# Patient Record
Sex: MALE | Race: WHITE | NOT HISPANIC OR LATINO | Employment: FULL TIME | URBAN - METROPOLITAN AREA
[De-identification: names, ages, dates, MRNs, and addresses within clinical notes are randomized per-mention and may not be internally consistent; named-entity substitution may affect disease eponyms.]

---

## 2021-01-21 ENCOUNTER — CLINICAL SUPPORT (OUTPATIENT)
Dept: URGENT CARE | Facility: CLINIC | Age: 56
End: 2021-01-21
Payer: COMMERCIAL

## 2021-01-21 VITALS — OXYGEN SATURATION: 99 % | HEART RATE: 81 BPM | TEMPERATURE: 97 F

## 2021-01-21 DIAGNOSIS — Z20.822 EXPOSURE TO COVID-19 VIRUS: Primary | ICD-10-CM

## 2021-01-21 LAB
CTP QC/QA: YES
SARS-COV-2 RDRP RESP QL NAA+PROBE: NEGATIVE

## 2021-01-21 PROCEDURE — U0002: ICD-10-PCS | Mod: QW,S$GLB,, | Performed by: INTERNAL MEDICINE

## 2021-01-21 PROCEDURE — U0002 COVID-19 LAB TEST NON-CDC: HCPCS | Mod: QW,S$GLB,, | Performed by: INTERNAL MEDICINE

## 2021-04-15 ENCOUNTER — PATIENT MESSAGE (OUTPATIENT)
Dept: RESEARCH | Facility: HOSPITAL | Age: 56
End: 2021-04-15

## 2023-07-04 ENCOUNTER — OFFICE VISIT (OUTPATIENT)
Dept: URGENT CARE | Facility: CLINIC | Age: 58
End: 2023-07-04
Payer: COMMERCIAL

## 2023-07-04 ENCOUNTER — TELEPHONE (OUTPATIENT)
Dept: URGENT CARE | Facility: CLINIC | Age: 58
End: 2023-07-04

## 2023-07-04 VITALS
RESPIRATION RATE: 20 BRPM | BODY MASS INDEX: 32.14 KG/M2 | HEART RATE: 103 BPM | HEIGHT: 66 IN | TEMPERATURE: 99 F | SYSTOLIC BLOOD PRESSURE: 125 MMHG | WEIGHT: 200 LBS | OXYGEN SATURATION: 96 % | DIASTOLIC BLOOD PRESSURE: 75 MMHG

## 2023-07-04 DIAGNOSIS — R05.1 ACUTE COUGH: ICD-10-CM

## 2023-07-04 DIAGNOSIS — U07.1 COVID-19: Primary | ICD-10-CM

## 2023-07-04 DIAGNOSIS — J02.9 SORE THROAT: ICD-10-CM

## 2023-07-04 LAB
CTP QC/QA: YES
MOLECULAR STREP A: NEGATIVE

## 2023-07-04 PROCEDURE — 87651 STREP A DNA AMP PROBE: CPT | Mod: QW,S$GLB,, | Performed by: NURSE PRACTITIONER

## 2023-07-04 PROCEDURE — 99204 PR OFFICE/OUTPT VISIT, NEW, LEVL IV, 45-59 MIN: ICD-10-PCS | Mod: S$GLB,,, | Performed by: NURSE PRACTITIONER

## 2023-07-04 PROCEDURE — 87651 POCT STREP A MOLECULAR: ICD-10-PCS | Mod: QW,S$GLB,, | Performed by: NURSE PRACTITIONER

## 2023-07-04 PROCEDURE — 99204 OFFICE O/P NEW MOD 45 MIN: CPT | Mod: S$GLB,,, | Performed by: NURSE PRACTITIONER

## 2023-07-04 RX ORDER — EMPAGLIFLOZIN 25 MG/1
25 TABLET, FILM COATED ORAL
COMMUNITY
Start: 2023-07-02

## 2023-07-04 RX ORDER — AMLODIPINE BESYLATE 5 MG/1
5 TABLET ORAL
COMMUNITY
Start: 2023-07-03

## 2023-07-04 RX ORDER — BENZONATATE 100 MG/1
100 CAPSULE ORAL 3 TIMES DAILY PRN
Qty: 30 CAPSULE | Refills: 0 | Status: SHIPPED | OUTPATIENT
Start: 2023-07-04 | End: 2023-07-04

## 2023-07-04 RX ORDER — METOPROLOL TARTRATE 50 MG/1
50 TABLET ORAL 2 TIMES DAILY
COMMUNITY
Start: 2023-06-21

## 2023-07-04 RX ORDER — OLMESARTAN MEDOXOMIL AND HYDROCHLOROTHIAZIDE 40/12.5 40; 12.5 MG/1; MG/1
1 TABLET ORAL
COMMUNITY
Start: 2023-04-11

## 2023-07-04 RX ORDER — GABAPENTIN 300 MG/1
300 CAPSULE ORAL 3 TIMES DAILY
COMMUNITY
Start: 2023-06-09

## 2023-07-04 RX ORDER — FLUTICASONE PROPIONATE 50 MCG
1 SPRAY, SUSPENSION (ML) NASAL DAILY
Qty: 9.9 ML | Refills: 0 | Status: SHIPPED | OUTPATIENT
Start: 2023-07-04

## 2023-07-04 RX ORDER — ATORVASTATIN CALCIUM 40 MG/1
40 TABLET, FILM COATED ORAL
COMMUNITY
Start: 2023-03-23

## 2023-07-04 RX ORDER — GLIMEPIRIDE 4 MG/1
4 TABLET ORAL
COMMUNITY
Start: 2022-12-23

## 2023-07-04 RX ORDER — BENZONATATE 100 MG/1
100 CAPSULE ORAL 3 TIMES DAILY PRN
Qty: 30 CAPSULE | Refills: 0 | Status: SHIPPED | OUTPATIENT
Start: 2023-07-04 | End: 2023-07-14

## 2023-07-04 RX ORDER — FLUTICASONE PROPIONATE 50 MCG
1 SPRAY, SUSPENSION (ML) NASAL DAILY
Qty: 9.9 ML | Refills: 0 | Status: SHIPPED | OUTPATIENT
Start: 2023-07-04 | End: 2023-07-04

## 2023-07-04 RX ORDER — ESCITALOPRAM OXALATE 20 MG/1
20 TABLET ORAL
COMMUNITY
Start: 2023-05-03

## 2023-07-04 NOTE — TELEPHONE ENCOUNTER
Patient called requesting medication from today's Urgent Care visit be sent to a different pharmacy as the original pharmacy was closed for the holiday. Medications sent to [facility] [address] as requested per patient.

## 2023-07-04 NOTE — PATIENT INSTRUCTIONS
POSITIVE COVID TEST:  REPORTS POSITIVE HOME TEST 24 HOURS AGO.  REFUSED PAXLOVID; CURRENTLY TAKING A STATIN.    You have tested positive for COVID-19 today.           ISOLATION    If you tested positive and do not have symptoms, you must isolate for 5 days starting on the day of the positive test. I         If you tested positive and have symptoms, you must isolate for 5 days starting on the day of the first symptoms,  not the day of the positive test.         This is the most important part, both the CDC and the [facility] emphasize that you do not test out of isolation.         After 5 days, if your symptoms have improved and you have not had fever on day 5, you can return to the community on day 6- NO TESTING REQUIRED!          In fact, we do not retest if you were positive in the last 90 days.         After your 5 days of isolation are completed, the CDC recommends strict mask use for the first 5 days that you come out of isolation.      When to Seek Emergency Medical Attention  Look for emergency warning signs* for COVID-19. If someone is showing any of these signs, seek emergency medical care immediately:    Trouble breathing  Persistent pain or pressure in the chest  New confusion  Inability to wake or stay awake  Pale, gray, or blue-colored skin, lips, or nail beds, depending on skin tone  *This list is not all possible symptoms. Please call your medical provider for any other symptoms that are severe or concerning to you.    Call 911 or call ahead to your local emergency facility: Notify the  that you are seeking care for someone who has or may have COVID-19.       For up to date guidelines please visit www.cdc.gov  If you have any questions you may call [facility] line [phone]    What advice should be given to patients with known or presumed COVID-19 managed at home?    For most patients with COVID-19 who are managed at home, we advise the following:    ?Supportive care with  antipyretics/analgesics (eg, acetaminophen) and hydration    ?Close contact with their health care provider    ?Monitoring for clinical worsening, particularly the development of new or worsening dyspnea, which should prompt clinical evaluation and possible hospitalization    ?Separation from other household members, including pets (eg, staying in a separate room when possible and wearing a mask when in the same room)    ?Frequent hand washing for all family members    ?Frequent disinfection of commonly touched surfaces    Some patients with cough or dyspnea may experience symptomatic improvement with self-proning (resting in the prone rather than the supine position)    All other care is generally supportive, similar to that advised for other acute viral illnesses:    ?We advise that patients stay well hydrated, particularly those patients with sustained or higher fevers, in whom insensible fluid losses may be significant.    ?Cough that is persistent, interferes with sleep, or causes discomfort can be managed with an over-the-counter cough medication (eg, dextromethorphan) or prescription benzonatate, 100 to 200 mg orally three times daily as needed.    ?We advise rest as needed during the acute illness; for patients without hypoxia, frequent repositioning and ambulation is encouraged. In addition, we encourage all patients to advance activity as soon as tolerated during recovery.      Additional instructions:  Separate yourself from other people and animals in your home.  Call ahead before visiting your doctor.  Wear a facemask when around others.  Cover your coughs and sneezes.  Wash your hands often with soap and water; hand  can be used, too.  Avoid sharing personal household items.  Wipe down surfaces used daily.  Monitor your symptoms. Seek prompt medical attention if your illness is worsening (e.g., difficulty breathing).   Before seeking care, call your healthcare provider.  If you have a medical  emergency and need to call 911, notify the dispatch personnel that you have, or are being evaluated for COVID-19. If possible, put on a facemask before emergency medical services arrive.      Contact Tracing for patients who have been vaccinated and agree to sequencing.    As one of the next steps, you will receive a call or text from the [place] Department of Health ([facility]) COVID-19 Tracing Team. See the contact information below so you know not to ignore the health departments call. It is important that you contact them back immediately so they can help.      Contact Tracer Number:  [phone]  Caller ID for most carriers: [place] Dept Health     What is contact tracing?  Contact tracing is a process that helps identify everyone who has been in close contact with an infected person. Contact tracers let those people know they may have been exposed and guide them on next steps. Confidentiality is important for everyone; no one will be told who may have exposed them to the virus.  Your involvement is important. The more we know about where and how this virus is spreading, the better chance we have at stopping it from spreading further.  What does exposure mean?  Exposure means you have been within 6 feet for more than 15 minutes with a person who has or had COVID-19.  What kind of questions do the contact tracers ask?  A contact tracer will confirm your basic contact information including name, address, phone number, and next of kin, as well as asking about any symptoms you may have had. Theyll also ask you how you think you may have gotten sick, such as places where you may have been exposed to the virus, and people you were with. Those names will never be shared with anyone outside of that call, and will only be used to help trace and stop the spread of the virus.   I have privacy concerns. How will the state use my information?  Your privacy about your health is important. All calls are completed using call  centers that use the appropriate health privacy protection measures (HIPAA compliance), meaning that your patient information is safe. No one will ever ask you any questions related to immigration status. Your health comes first.   Do I have to participate?  You do not have to participate, but we strongly encourage you to. Contact tracing can help us catch and control new outbreaks as theyre developing to keep your friends and family safe.   What if I dont hear from anyone?  If you dont receive a call within 24 hours, you can call the number above right away to inquire about your status. That line is open from 8:00 am - 8:00 p.m., 7 days a week.  Contact tracing saves lives! Together, we have the power to beat this virus and keep our loved ones and neighbors safe.    For more information see CDC link below.      https://www.cdc.gov/coronavirus/2019-ncov/hcp/guidance-prevent-spread.html#precautions        Sources:  [facility], [facility] and [facility]       You must understand that you've received an Urgent Care treatment only and that you may be released before all your medical problems are known or treated. You, the patient, will arrange for follow up care as instructed.  Follow up with your PCP or specialty clinic as directed in the next 1-2 weeks if not improved or as needed.  You can call [phone] to schedule an appointment with the appropriate provider.  If your condition worsens we recommend that you receive another evaluation at the emergency room immediately or contact your primary medical clinics after hours call service to discuss your concerns.  Please return here or go to the Emergency Department for any concerns or worsening of condition.    If you were prescribed a narcotic or controlled medication, do not drive or operate heavy equipment or machinery while taking these medications.    Thank you for choosing [facility]!    Our goal in the Urgent Care is to always provide  outstanding medical care. You may receive a survey by mail or e-mail in the next week regarding your experience today. We would greatly appreciate you completing and returning the survey. Your feedback provides us with a way to recognize our staff who provide very good care, and it helps us learn how to improve when your experience was below our aspiration of excellence.      We appreciate you trusting us with your medical care. We hope you feel better soon. We will be happy to take care of you for all of your future medical needs.    Sincerely,    Haroon De La Vega DNP, FNP-C

## 2023-07-04 NOTE — PROGRESS NOTES
"Subjective:      Patient ID: Pantera Orourke is a 57 y.o. male.    Vitals:  height is 5' 6" (1.676 m) and weight is 90.7 kg (200 lb). His oral temperature is 99.4 °F (37.4 °C). His blood pressure is 125/75 and his pulse is 103. His respiration is 20 and oxygen saturation is 96%.     Chief Complaint: COVID-19 Concerns (Tested positive for covid on 7/2/2023. Cough, chills, SEVERE soar throat pain. Can't sleep throat hurts so bad. - Entered by patient), Cough, and Generalized Body Aches    57 y.o. male who presents today with a complaint of a sore throat, non-productive cough, congestion, and body aches x 3 days. Reports a positive home Covid test on yesterday.  He denies SOB, chest pain, or palpitations.  Currently refuses Paxlovid.  Discussed symptom management and emergent precautions; he verbalized his understanding.  His spouse is in attendance.    Cough  This is a new problem. The current episode started in the past 7 days. The problem has been unchanged. The cough is Non-productive. Associated symptoms include chills, a fever, headaches, myalgias, nasal congestion, postnasal drip, rhinorrhea, a sore throat, sweats and wheezing. Pertinent negatives include no chest pain, ear congestion, ear pain, heartburn, hemoptysis, rash or weight loss. The symptoms are aggravated by lying down and cold air. Risk factors for lung disease include travel. He has tried nothing for the symptoms. There is no history of asthma, bronchiectasis, bronchitis, COPD, emphysema, environmental allergies or pneumonia.     Constitution: Positive for chills and fever. Negative for generalized weakness and international travel in last 60 days.   HENT:  Positive for postnasal drip and sore throat. Negative for ear pain, sinus pain, sinus pressure, trouble swallowing and voice change.    Neck: Negative for neck stiffness.   Cardiovascular:  Negative for chest pain and palpitations.   Respiratory:  Positive for cough and wheezing. Negative for " sputum production and bloody sputum.    Gastrointestinal:  Negative for heartburn.   Musculoskeletal:  Positive for muscle ache.   Skin:  Negative for rash.   Allergic/Immunologic: Negative for environmental allergies.   Neurological:  Positive for headaches. Negative for dizziness.    Objective:     Physical Exam   Constitutional: He is oriented to person, place, and time. He appears well-developed. He is cooperative.  Non-toxic appearance. He does not appear ill. No distress.   HENT:   Head: Normocephalic and atraumatic.   Ears:   Right Ear: Hearing, tympanic membrane, external ear and ear canal normal.   Left Ear: Hearing, tympanic membrane, external ear and ear canal normal.   Nose: Rhinorrhea and congestion present. No mucosal edema, purulent discharge, sinus tenderness or nasal deformity. No epistaxis. Right sinus exhibits no maxillary sinus tenderness and no frontal sinus tenderness. Left sinus exhibits no maxillary sinus tenderness and no frontal sinus tenderness.   Mouth/Throat: Uvula is midline and mucous membranes are normal. No trismus in the jaw. Normal dentition. No uvula swelling. Posterior oropharyngeal erythema present. No oropharyngeal exudate, posterior oropharyngeal edema, tonsillar abscesses or cobblestoning. No tonsillar exudate.   Eyes: Conjunctivae and lids are normal. No scleral icterus.   Neck: Trachea normal and phonation normal. Neck supple. No edema present. No erythema present. No neck rigidity present.   Cardiovascular: Normal rate, regular rhythm, normal heart sounds and normal pulses.   Pulmonary/Chest: Effort normal and breath sounds normal. No respiratory distress. He has no decreased breath sounds. He has no rhonchi.   Abdominal: Normal appearance.   Musculoskeletal: Normal range of motion.         General: No deformity. Normal range of motion.   Neurological: He is alert and oriented to person, place, and time. He exhibits normal muscle tone. Coordination normal.   Skin: Skin is  warm, dry, intact, not diaphoretic and not pale.   Psychiatric: His speech is normal and behavior is normal. Judgment and thought content normal.   Nursing note and vitals reviewed.    Assessment:     1. COVID-19    2. Sore throat    3. Acute cough      Results for orders placed or performed in visit on 07/04/23   POCT Strep A, Molecular   Result Value Ref Range    Molecular Strep A, POC Negative Negative     Acceptable Yes         Plan:       COVID-19  -     Discontinue: fluticasone propionate (FLONASE) 50 mcg/actuation nasal spray; 1 spray (50 mcg total) by Each Nostril route once daily.  Dispense: 9.9 mL; Refill: 0  -     fluticasone propionate (FLONASE) 50 mcg/actuation nasal spray; 1 spray (50 mcg total) by Each Nostril route once daily.  Dispense: 9.9 mL; Refill: 0    Sore throat  -     POCT Strep A, Molecular  -     Discontinue: diphenhydrAMINE-aluminum-magnesium hydroxide-simethicone-LIDOcaine HCl 2%; Swish and spit 15 mLs every 4 (four) hours as needed (sore throat).  Dispense: 120 each; Refill: 0    Acute cough  -     Discontinue: benzonatate (TESSALON) 100 MG capsule; Take 1 capsule (100 mg total) by mouth 3 (three) times daily as needed for Cough.  Dispense: 30 capsule; Refill: 0  -     benzonatate (TESSALON) 100 MG capsule; Take 1 capsule (100 mg total) by mouth 3 (three) times daily as needed for Cough.  Dispense: 30 capsule; Refill: 0    POSITIVE COVID TEST    You have tested positive for COVID-19 today.           ISOLATION    If you tested positive and do not have symptoms, you must isolate for 5 days starting on the day of the positive test. I         If you tested positive and have symptoms, you must isolate for 5 days starting on the day of the first symptoms,  not the day of the positive test.         This is the most important part, both the CDC and the [facility] emphasize that you do not test out of isolation.         After 5 days, if your symptoms have improved and you have not had  fever on day 5, you can return to the community on day 6- NO TESTING REQUIRED!          In fact, we do not retest if you were positive in the last 90 days.         After your 5 days of isolation are completed, the CDC recommends strict mask use for the first 5 days that you come out of isolation.      When to Seek Emergency Medical Attention  Look for emergency warning signs* for COVID-19. If someone is showing any of these signs, seek emergency medical care immediately:    Trouble breathing  Persistent pain or pressure in the chest  New confusion  Inability to wake or stay awake  Pale, gray, or blue-colored skin, lips, or nail beds, depending on skin tone  *This list is not all possible symptoms. Please call your medical provider for any other symptoms that are severe or concerning to you.    Call 911 or call ahead to your local emergency facility: Notify the  that you are seeking care for someone who has or may have COVID-19.       For up to date guidelines please visit www.cdc.gov  If you have any questions you may call [facility] line [phone]    What advice should be given to patients with known or presumed COVID-19 managed at home?    For most patients with COVID-19 who are managed at home, we advise the following:    ?Supportive care with antipyretics/analgesics (eg, acetaminophen) and hydration    ?Close contact with their health care provider    ?Monitoring for clinical worsening, particularly the development of new or worsening dyspnea, which should prompt clinical evaluation and possible hospitalization    ?Separation from other household members, including pets (eg, staying in a separate room when possible and wearing a mask when in the same room)    ?Frequent hand washing for all family members    ?Frequent disinfection of commonly touched surfaces    Some patients with cough or dyspnea may experience symptomatic improvement with self-proning (resting in the prone rather than  the supine position)    All other care is generally supportive, similar to that advised for other acute viral illnesses:    ?We advise that patients stay well hydrated, particularly those patients with sustained or higher fevers, in whom insensible fluid losses may be significant.    ?Cough that is persistent, interferes with sleep, or causes discomfort can be managed with an over-the-counter cough medication (eg, dextromethorphan) or prescription benzonatate, 100 to 200 mg orally three times daily as needed.    ?We advise rest as needed during the acute illness; for patients without hypoxia, frequent repositioning and ambulation is encouraged. In addition, we encourage all patients to advance activity as soon as tolerated during recovery.      Additional instructions:  Separate yourself from other people and animals in your home.  Call ahead before visiting your doctor.  Wear a facemask when around others.  Cover your coughs and sneezes.  Wash your hands often with soap and water; hand  can be used, too.  Avoid sharing personal household items.  Wipe down surfaces used daily.  Monitor your symptoms. Seek prompt medical attention if your illness is worsening (e.g., difficulty breathing).   Before seeking care, call your healthcare provider.  If you have a medical emergency and need to call 911, notify the dispatch personnel that you have, or are being evaluated for COVID-19. If possible, put on a facemask before emergency medical services arrive.      Contact Tracing for patients who have been vaccinated and agree to sequencing.    As one of the next steps, you will receive a call or text from the [place] Department of Health ([facility]) COVID-19 Tracing Team. See the contact information below so you know not to ignore the health departments call. It is important that you contact them back immediately so they can help.      Contact Tracer Number:  [phone]  Caller ID for most carriers: [facility]      What is contact tracing?  Contact tracing is a process that helps identify everyone who has been in close contact with an infected person. Contact tracers let those people know they may have been exposed and guide them on next steps. Confidentiality is important for everyone; no one will be told who may have exposed them to the virus.  Your involvement is important. The more we know about where and how this virus is spreading, the better chance we have at stopping it from spreading further.  What does exposure mean?  Exposure means you have been within 6 feet for more than 15 minutes with a person who has or had COVID-19.  What kind of questions do the contact tracers ask?  A contact tracer will confirm your basic contact information including name, address, phone number, and next of kin, as well as asking about any symptoms you may have had. Theyll also ask you how you think you may have gotten sick, such as places where you may have been exposed to the virus, and people you were with. Those names will never be shared with anyone outside of that call, and will only be used to help trace and stop the spread of the virus.   I have privacy concerns. How will the state use my information?  Your privacy about your health is important. All calls are completed using call centers that use the appropriate health privacy protection measures (HIPAA compliance), meaning that your patient information is safe. No one will ever ask you any questions related to immigration status. Your health comes first.   Do I have to participate?  You do not have to participate, but we strongly encourage you to. Contact tracing can help us catch and control new outbreaks as theyre developing to keep your friends and family safe.   What if I dont hear from anyone?  If you dont receive a call within 24 hours, you can call the number above right away to inquire about your status. That line is open from 8:00 am - 8:00 p.m., 7 days a  week.  Contact tracing saves lives! Together, we have the power to beat this virus and keep our loved ones and neighbors safe.    For more information see CDC link below.      https://www.cdc.gov/coronavirus/2019-ncov/hcp/guidance-prevent-spread.html#precautions        Sources:  [facility], [place] [facility]

## 2024-10-28 ENCOUNTER — OFFICE VISIT (OUTPATIENT)
Dept: INTERNAL MEDICINE | Facility: CLINIC | Age: 59
End: 2024-10-28
Payer: COMMERCIAL

## 2024-10-28 VITALS
HEIGHT: 66 IN | SYSTOLIC BLOOD PRESSURE: 100 MMHG | OXYGEN SATURATION: 98 % | BODY MASS INDEX: 31.18 KG/M2 | HEART RATE: 98 BPM | DIASTOLIC BLOOD PRESSURE: 60 MMHG | WEIGHT: 194 LBS

## 2024-10-28 DIAGNOSIS — R23.8 SKIN SENSITIVITY: Primary | ICD-10-CM

## 2024-10-28 PROCEDURE — 3078F DIAST BP <80 MM HG: CPT | Mod: CPTII,S$GLB,,

## 2024-10-28 PROCEDURE — 1159F MED LIST DOCD IN RCRD: CPT | Mod: CPTII,S$GLB,,

## 2024-10-28 PROCEDURE — 99203 OFFICE O/P NEW LOW 30 MIN: CPT | Mod: S$GLB,,,

## 2024-10-28 PROCEDURE — 3008F BODY MASS INDEX DOCD: CPT | Mod: CPTII,S$GLB,,

## 2024-10-28 PROCEDURE — 99999 PR PBB SHADOW E&M-EST. PATIENT-LVL IV: CPT | Mod: PBBFAC,,,

## 2024-10-28 PROCEDURE — 1160F RVW MEDS BY RX/DR IN RCRD: CPT | Mod: CPTII,S$GLB,,

## 2024-10-28 PROCEDURE — 3074F SYST BP LT 130 MM HG: CPT | Mod: CPTII,S$GLB,,

## 2024-10-28 RX ORDER — CETIRIZINE HYDROCHLORIDE 10 MG/1
10 TABLET ORAL DAILY
Qty: 30 TABLET | Refills: 0 | Status: SHIPPED | OUTPATIENT
Start: 2024-10-28 | End: 2025-10-28

## 2024-11-01 ENCOUNTER — TELEPHONE (OUTPATIENT)
Dept: INTERNAL MEDICINE | Facility: CLINIC | Age: 59
End: 2024-11-01
Payer: COMMERCIAL

## 2024-11-01 NOTE — TELEPHONE ENCOUNTER
----- Message from Nikky sent at 11/1/2024  9:38 AM CDT -----  Regarding: Medical Advice  Contact: Patient Spouse Madelaine  Type:  Needs Medical Advice    Who Called: Madelaine (Spouse)  Symptoms (please be specific):  skin sensitivity    How long has patient had these symptoms:  n/a   Pharmacy name and phone #:  [id number] - [place], [place] - [address] AT [address]   Phone: [phone]  Would the patient rather a call back or a response via [facility]? Call back   Best Call Back Number:  [phone]   Additional Information: Spouse stated patient was advised to contact physician if advice given did not work Patient is still having issues and would like a call back for further assistance Patient would like an appt today Attempted to schedule first available was not until 12/02/2024 Please Assist

## 2024-11-01 NOTE — TELEPHONE ENCOUNTER
Getting worse   Unable to touch , skin irritation   fatigue , sleeping hours after return from work   Not eating  ,   Has a history of heart disease , on more the 1 blood pressure pill   Suggested urgent care ot ER  And will forward message to doctor

## 2024-11-02 ENCOUNTER — HOSPITAL ENCOUNTER (EMERGENCY)
Facility: HOSPITAL | Age: 59
Discharge: HOME | End: 2024-11-02
Attending: EMERGENCY MEDICINE
Payer: COMMERCIAL

## 2024-11-02 VITALS
SYSTOLIC BLOOD PRESSURE: 116 MMHG | HEIGHT: 66 IN | HEART RATE: 92 BPM | RESPIRATION RATE: 16 BRPM | DIASTOLIC BLOOD PRESSURE: 74 MMHG | BODY MASS INDEX: 32.14 KG/M2 | OXYGEN SATURATION: 96 % | TEMPERATURE: 98 F | WEIGHT: 200 LBS

## 2024-11-02 DIAGNOSIS — R20.8 ALLODYNIA: Primary | ICD-10-CM

## 2024-11-02 DIAGNOSIS — R53.83 FATIGUE, UNSPECIFIED TYPE: ICD-10-CM

## 2024-11-02 LAB
ALBUMIN SERPL BCP-MCNC: 4.3 G/DL (ref 3.5–5.2)
ALP SERPL-CCNC: 81 U/L (ref 40–150)
ALT SERPL W/O P-5'-P-CCNC: 23 U/L (ref 10–44)
ANION GAP SERPL CALC-SCNC: 11 MMOL/L (ref 8–16)
AST SERPL-CCNC: 22 U/L (ref 10–40)
BACTERIA #/AREA URNS AUTO: ABNORMAL /HPF
BASOPHILS # BLD AUTO: 0.04 K/UL (ref 0–0.2)
BASOPHILS NFR BLD: 0.5 % (ref 0–1.9)
BILIRUB SERPL-MCNC: 0.8 MG/DL (ref 0.1–1)
BILIRUB UR QL STRIP: NEGATIVE
BUN SERPL-MCNC: 26 MG/DL (ref 6–20)
BUN SERPL-MCNC: 26 MG/DL (ref 6–30)
CALCIUM SERPL-MCNC: 10.8 MG/DL (ref 8.7–10.5)
CHLORIDE SERPL-SCNC: 101 MMOL/L (ref 95–110)
CHLORIDE SERPL-SCNC: 99 MMOL/L (ref 95–110)
CLARITY UR REFRACT.AUTO: CLEAR
CO2 SERPL-SCNC: 26 MMOL/L (ref 23–29)
COLOR UR AUTO: YELLOW
CREAT SERPL-MCNC: 1.4 MG/DL (ref 0.5–1.4)
CREAT SERPL-MCNC: 1.6 MG/DL (ref 0.5–1.4)
CRP SERPL-MCNC: 1.5 MG/L (ref 0–8.2)
DIFFERENTIAL METHOD BLD: NORMAL
EOSINOPHIL # BLD AUTO: 0 K/UL (ref 0–0.5)
EOSINOPHIL NFR BLD: 0.3 % (ref 0–8)
ERYTHROCYTE [DISTWIDTH] IN BLOOD BY AUTOMATED COUNT: 12.2 % (ref 11.5–14.5)
EST. GFR  (NO RACE VARIABLE): 49.6 ML/MIN/1.73 M^2
GLUCOSE SERPL-MCNC: 177 MG/DL (ref 70–110)
GLUCOSE SERPL-MCNC: 58 MG/DL (ref 70–110)
GLUCOSE UR QL STRIP: ABNORMAL
HCT VFR BLD AUTO: 43.5 % (ref 40–54)
HCT VFR BLD CALC: 36 %PCV (ref 36–54)
HCV AB SERPL QL IA: NORMAL
HGB BLD-MCNC: 14 G/DL (ref 14–18)
HGB UR QL STRIP: NEGATIVE
HIV 1+2 AB+HIV1 P24 AG SERPL QL IA: NORMAL
HYALINE CASTS UR QL AUTO: 6 /LPF
IMM GRANULOCYTES # BLD AUTO: 0.02 K/UL (ref 0–0.04)
IMM GRANULOCYTES NFR BLD AUTO: 0.3 % (ref 0–0.5)
KETONES UR QL STRIP: NEGATIVE
LEUKOCYTE ESTERASE UR QL STRIP: NEGATIVE
LYMPHOCYTES # BLD AUTO: 2.1 K/UL (ref 1–4.8)
LYMPHOCYTES NFR BLD: 28.2 % (ref 18–48)
MAGNESIUM SERPL-MCNC: 2.1 MG/DL (ref 1.6–2.6)
MCH RBC QN AUTO: 29.5 PG (ref 27–31)
MCHC RBC AUTO-ENTMCNC: 32.2 G/DL (ref 32–36)
MCV RBC AUTO: 92 FL (ref 82–98)
MICROSCOPIC COMMENT: ABNORMAL
MONOCYTES # BLD AUTO: 0.6 K/UL (ref 0.3–1)
MONOCYTES NFR BLD: 7.6 % (ref 4–15)
NEUTROPHILS # BLD AUTO: 4.7 K/UL (ref 1.8–7.7)
NEUTROPHILS NFR BLD: 63.1 % (ref 38–73)
NITRITE UR QL STRIP: NEGATIVE
NRBC BLD-RTO: 0 /100 WBC
PH UR STRIP: 5 [PH] (ref 5–8)
PLATELET # BLD AUTO: 231 K/UL (ref 150–450)
PMV BLD AUTO: 12.7 FL (ref 9.2–12.9)
POC IONIZED CALCIUM: 1.31 MMOL/L (ref 1.06–1.42)
POC TCO2 (MEASURED): 27 MMOL/L (ref 23–29)
POTASSIUM BLD-SCNC: 4.1 MMOL/L (ref 3.5–5.1)
POTASSIUM SERPL-SCNC: 4.4 MMOL/L (ref 3.5–5.1)
PROT SERPL-MCNC: 7.6 G/DL (ref 6–8.4)
PROT UR QL STRIP: NEGATIVE
RBC # BLD AUTO: 4.75 M/UL (ref 4.6–6.2)
RBC #/AREA URNS AUTO: 1 /HPF (ref 0–4)
SAMPLE: ABNORMAL
SODIUM BLD-SCNC: 140 MMOL/L (ref 136–145)
SODIUM SERPL-SCNC: 136 MMOL/L (ref 136–145)
SP GR UR STRIP: >1.03 (ref 1–1.03)
SQUAMOUS #/AREA URNS AUTO: 0 /HPF
TSH SERPL DL<=0.005 MIU/L-ACNC: 2.76 UIU/ML (ref 0.4–4)
URN SPEC COLLECT METH UR: ABNORMAL
WBC # BLD AUTO: 7.37 K/UL (ref 3.9–12.7)
WBC #/AREA URNS AUTO: 0 /HPF (ref 0–5)
YEAST UR QL AUTO: ABNORMAL

## 2024-11-02 PROCEDURE — 99284 EMERGENCY DEPT VISIT MOD MDM: CPT | Mod: 25

## 2024-11-02 PROCEDURE — 81001 URINALYSIS AUTO W/SCOPE: CPT | Performed by: EMERGENCY MEDICINE

## 2024-11-02 PROCEDURE — 87389 HIV-1 AG W/HIV-1&-2 AB AG IA: CPT | Performed by: PHYSICIAN ASSISTANT

## 2024-11-02 PROCEDURE — 96360 HYDRATION IV INFUSION INIT: CPT

## 2024-11-02 PROCEDURE — 25000003 PHARM REV CODE 250: Performed by: EMERGENCY MEDICINE

## 2024-11-02 PROCEDURE — 80047 BASIC METABLC PNL IONIZED CA: CPT

## 2024-11-02 PROCEDURE — 86803 HEPATITIS C AB TEST: CPT | Performed by: PHYSICIAN ASSISTANT

## 2024-11-02 PROCEDURE — 80053 COMPREHEN METABOLIC PANEL: CPT | Performed by: EMERGENCY MEDICINE

## 2024-11-02 PROCEDURE — 85025 COMPLETE CBC W/AUTO DIFF WBC: CPT | Performed by: EMERGENCY MEDICINE

## 2024-11-02 PROCEDURE — 86140 C-REACTIVE PROTEIN: CPT | Performed by: EMERGENCY MEDICINE

## 2024-11-02 PROCEDURE — 84443 ASSAY THYROID STIM HORMONE: CPT | Performed by: EMERGENCY MEDICINE

## 2024-11-02 PROCEDURE — 83735 ASSAY OF MAGNESIUM: CPT | Performed by: EMERGENCY MEDICINE

## 2024-11-02 RX ORDER — PREDNISONE 20 MG/1
40 TABLET ORAL DAILY
Qty: 10 TABLET | Refills: 0 | Status: SHIPPED | OUTPATIENT
Start: 2024-11-02 | End: 2024-11-07

## 2024-11-02 RX ADMIN — SODIUM CHLORIDE 1000 ML: 9 INJECTION, SOLUTION INTRAVENOUS at 12:11

## 2024-11-02 NOTE — ED TRIAGE NOTES
Patient identifiers for Pantera Orourke 59 y.o. male checked and correct.  Chief Complaint   Patient presents with    Multiple complaints     Skin sensitivity for 2 weeks , told allergy and to take zertec, now joints hurting, sleeping alot     No past medical history on file.  Allergies reported:   Review of patient's allergies indicates:   Allergen Reactions    Codeine Nausea Only and Nausea And Vomiting         LOC: Patient is awake, alert, and aware of environment with an appropriate affect. Patient is oriented x 4 and speaking appropriately.  APPEARANCE: Patient resting comfortably and in no acute distress. Patient is clean and well groomed, patient's clothing is properly fastened.  SKIN: The skin is warm and dry. Patient has normal skin turgor and moist mucus membranes.   MUSKULOSKELETAL: Patient is moving all extremities well, no obvious deformities noted. Pulses intact. generalized joint pain  RESPIRATORY: Airway is open and patent. Respirations are spontaneous and non-labored with normal effort and rate.  CARDIAC: Patient has a normal rate and rhythm. Normal sinus on cardiac monitor. No peripheral edema noted.   ABDOMEN: No distention noted. Soft and non-tender upon palpation.  NEUROLOGICAL:  PERRL. Facial expression is symmetrical. Hand grasps are equal bilaterally. Normal sensation in all extremities when touched with finger.

## 2024-11-02 NOTE — ED NOTES
I-STAT Chem-8+ Results:   Value Reference Range   Sodium 140 136-145 mmol/L   Potassium  4.1 3.5-5.1 mmol/L   Chloride 101  mmol/L   Ionized Calcium 1.31 1.06-1.42 mmol/L   CO2 (measured) 27 23-29 mmol/L   Glucose 58  mg/dL   BUN 26 6-30 mg/dL   Creatinine 1.4 0.5-1.4 mg/dL   Hematocrit 36 36-54%

## 2024-11-02 NOTE — ED PROVIDER NOTES
Encounter Date: 11/2/2024       History     Chief Complaint   Patient presents with    Multiple complaints     Skin sensitivity for 2 weeks , told allergy and to take zertec, now joints hurting, sleeping alot     HPI  59-year-old male with a history of hypertension and diabetes who presents with some global complaints.  Starting 2 weeks ago he was started having skin sensitivity.  He reports that the skin over his entire body feels overly sensitive and almost like he has a sunburn.  Whenever anything touches it it feels painful.  He describes feeling like he can feels a muscles underneath.  Walking upstairs hurts.  His scalp feels like it is bruised.  He has not had any color changes or rashes.  No new medications or dose changes.  He reports he was doing some Aveeno and Krill oil but otherwise no new detergents or soaps.  He has been seen multiple times including in the ED and by his PCP.  He had some lab work done and was told that his calcium was high.  His PCP tried him on Zyrtec due to concern for possible allergies as the cause but he reports no relief after taking this for at least a week.  Over the last few days he was become more fatigued.  He now we will get home from work at 4:00 p.m., sleep until 10:00 p.m., then go back to bed until the morning.  While he was awake he was very fatigued and has no energy.  He also reports that all of his joints are achy.  No joint swelling or discoloration.  No other swelling in any other areas.  No vomiting or diarrhea.  No fevers or chills.  No sweating.  He was report some likely weight loss due to fatigue so bad that he does not want to get up to eat or drink much.      Review of patient's allergies indicates:   Allergen Reactions    Codeine Nausea Only and Nausea And Vomiting     Past Medical History:   Diagnosis Date    Hypertension      Past Surgical History:   Procedure Laterality Date    BYPASS GRAFT       No family history on file.  Social History     Tobacco  Use    Smoking status: Never    Smokeless tobacco: Never     Review of Systems    Physical Exam     Initial Vitals [11/02/24 0924]   BP Pulse Resp Temp SpO2   119/81 107 16 99 °F (37.2 °C) 97 %      MAP       --         Physical Exam    Nursing note and vitals reviewed.  Constitutional: He appears well-developed and well-nourished. No distress.   HENT:   Head: Normocephalic and atraumatic.   Nose: Nose normal. Mouth/Throat: Oropharynx is clear and moist.   Scalp normal in appearance   Eyes: Conjunctivae and EOM are normal. Pupils are equal, round, and reactive to light.   Neck:   Normal range of motion.  Cardiovascular:  Normal rate, regular rhythm and normal heart sounds.     Exam reveals no gallop and no friction rub.       No murmur heard.  Pulmonary/Chest: Breath sounds normal. No respiratory distress. He has no wheezes. He has no rhonchi. He has no rales.   Abdominal: Abdomen is soft. He exhibits no distension. There is no abdominal tenderness. There is no rebound and no guarding.   Musculoskeletal:         General: No tenderness or edema. Normal range of motion.      Cervical back: Normal range of motion.      Comments: No focal joint swelling or tenderness     Neurological: He is alert and oriented to person, place, and time. He has normal strength. No sensory deficit.   Skin: Skin is warm and dry. Capillary refill takes less than 2 seconds. No rash noted. No erythema. No pallor.   Subjective sensitivity to touching anywhere but skin over trunk and extremities and face it was normal in appearance   Psychiatric: He has a normal mood and affect.         ED Course   Procedures  Labs Reviewed   COMPREHENSIVE METABOLIC PANEL - Abnormal       Result Value    Sodium 136      Potassium 4.4      Chloride 99      CO2 26      Glucose 177 (*)     BUN 26 (*)     Creatinine 1.6 (*)     Calcium 10.8 (*)     Total Protein 7.6      Albumin 4.3      Total Bilirubin 0.8      Alkaline Phosphatase 81      AST 22      ALT 23       eGFR 49.6 (*)     Anion Gap 11     URINALYSIS, REFLEX TO URINE CULTURE - Abnormal    Specimen UA Urine, Clean Catch      Color, UA Yellow      Appearance, UA Clear      pH, UA 5.0      Specific Gravity, UA >1.030 (*)     Protein, UA Negative      Glucose, UA 4+ (*)     Ketones, UA Negative      Bilirubin (UA) Negative      Occult Blood UA Negative      Nitrite, UA Negative      Leukocytes, UA Negative      Narrative:     Specimen Source->Urine   URINALYSIS MICROSCOPIC - Abnormal    RBC, UA 1      WBC, UA 0      Bacteria None      Yeast, UA None      Squam Epithel, UA 0      Hyaline Casts, UA 6 (*)     Microscopic Comment SEE COMMENT      Narrative:     Specimen Source->Urine   ISTAT PROCEDURE - Abnormal    POC Glucose 58 (*)     POC BUN 26      POC Creatinine 1.4      POC Sodium 140      POC Potassium 4.1      POC Chloride 101      POC TCO2 (MEASURED) 27      POC Ionized Calcium 1.31      POC Hematocrit 36      Sample PETRONA     HIV 1 / 2 ANTIBODY    HIV 1/2 Ag/Ab Non-reactive      Narrative:     Release to patient->Immediate   HEPATITIS C ANTIBODY    Hepatitis C Ab Non-reactive      Narrative:     Release to patient->Immediate   CBC W/ AUTO DIFFERENTIAL    WBC 7.37      RBC 4.75      Hemoglobin 14.0      Hematocrit 43.5      MCV 92      MCH 29.5      MCHC 32.2      RDW 12.2      Platelets 231      MPV 12.7      Immature Granulocytes 0.3      Gran # (ANC) 4.7      Immature Grans (Abs) 0.02      Lymph # 2.1      Mono # 0.6      Eos # 0.0      Baso # 0.04      nRBC 0      Gran % 63.1      Lymph % 28.2      Mono % 7.6      Eosinophil % 0.3      Basophil % 0.5      Differential Method Automated     MAGNESIUM    Magnesium 2.1     TSH    TSH 2.760     C-REACTIVE PROTEIN    CRP 1.5     ISTAT CHEM8          Imaging Results    None          Medications   sodium chloride 0.9% bolus 1,000 mL 1,000 mL (0 mLs Intravenous Stopped 11/2/24 4671)     Medical Decision Making  59-year-old male who presents with 2 weeks of entire body  skin sensitivity without any actual skin changes, as well as global fatigue and joint aching without any sort of joint swelling or erythema.  Here his temperature is 99° but has not had any true fevers.  He was mildly tachycardic.  Other vital signs stable.  His exam is overall quite benign.  No new medications, on Ozempic for a year he reports.  Differential includes some sort of new rheumatologic issue, electrolyte abnormality, hyperbilirubinemia, amongst many others.  Point we will check some labs see if there is anything emergent to treat.  Everything is stable, likely will need urgent referral to rheumatology for further workup evaluation.  It does not seem dermatologic in nature so I think rheumatology he was the place to start.    Overall labs are reassuring except for a mild REDD.  Given IV fluids and improved.  He understands the need to start orally hydrating at home which he was able to do.  We will do a low dose course of steroids for 5 days to see if that helps symptoms.  referral to Rheumatology placed.    Amount and/or Complexity of Data Reviewed  Labs: ordered.    Risk  Prescription drug management.                                      Clinical Impression:  Final diagnoses:  [R20.8] Allodynia (Primary)  [R53.83] Fatigue, unspecified type          ED Disposition Condition    Discharge Stable          ED Prescriptions       Medication Sig Dispense Start Date End Date Auth. Provider    predniSONE (DELTASONE) 20 MG tablet Take 2 tablets (40 mg total) by mouth once daily. for 5 days 10 tablet 11/2/2024 11/7/2024 Kathleen Kerr MD          Follow-up Information       Follow up With Specialties Details Why Contact Info Additional Information    [id number] Schedule an appointment as soon as possible for a visit   [address] [place] [id number] [phone] [facility] - Main Building, 5th Floor Please park in [facility] and use  Atrium elevator             Kathleen Kerr MD  11/02/24 7876

## 2024-11-06 ENCOUNTER — TELEPHONE (OUTPATIENT)
Dept: RHEUMATOLOGY | Facility: CLINIC | Age: 59
End: 2024-11-06
Payer: COMMERCIAL

## 2024-11-06 NOTE — TELEPHONE ENCOUNTER
T.C. to patient. States he feels better now after being treated with steroids in the emergency room.  States he would like to see a rheumatologist for his skin symptoms of being very sensitive and tiredness.   Patient placed on wait ist for first available appointment.

## 2024-11-06 NOTE — TELEPHONE ENCOUNTER
----- Message from Juanito Robison sent at 11/4/2024 11:21 AM CST -----  Regarding: FW: Appt Sooner  Contact: [phone]    ----- Message -----  From: Shyanne Gutierrez  Sent: 11/4/2024  10:52 AM CST  To: [facility] Clinical Support Staff  Subject: Appt Sooner                                      Pantera Orourke calling regarding Patient Advice (message) for # pt wife Madelaine is calling to speak with a nurse to get an appt for pt asap, caller states that the pt is in pain like skin feels like it is burning, went to see  Dermatologist and no signs of lesions or cuts and needs to know what is going on no appt in Epic and Wife is asking for the soonest avail pls advise and add to waitlist (referral in chart)    [phone]- wife number